# Patient Record
Sex: FEMALE | Race: WHITE
[De-identification: names, ages, dates, MRNs, and addresses within clinical notes are randomized per-mention and may not be internally consistent; named-entity substitution may affect disease eponyms.]

---

## 2017-02-14 ENCOUNTER — HOSPITAL ENCOUNTER (OUTPATIENT)
Dept: HOSPITAL 62 - SP | Age: 82
End: 2017-02-14
Attending: NURSE PRACTITIONER
Payer: MEDICARE

## 2017-02-14 DIAGNOSIS — L97.222: Primary | ICD-10-CM

## 2017-02-14 PROCEDURE — 93925 LOWER EXTREMITY STUDY: CPT

## 2017-02-14 NOTE — XCELERA REPORT
[address] [address]

                             Tel: [phone]

                             Fax: [phone]



                    Lower Extremity Arterial Evaluation

____________________________________________________________________________



Name: CARROLL COOK

MRN: [id number]                Age: 81 yrs

Gender: Female                 : 1935

Patient Status: Outpatient     Patient Location: 

Account #: [id number]

Study Date: 2017 01:22 PM

____________________________________________________________________________



Procedure: A color flow and duplex scan of the lower extremity arteries was

performed bilaterally with velocity and waveform anaylsis. Ankle brachial

indicies performed.

Reason For Study: ULCER





Ordering Physician: MIKEY SAUCEDO

Performed By: Laci Ocasio

____________________________________________________________________________



____________________________________________________________________________





Measurements and Calculations



                                   Right         Left

  CFA PSV                          194.2        189.8    cm/sec

  Prox PFA PSV                     -102.5       -88.0    cm/sec

  Dist SFA PSV                     -115.7       -150.9   cm/sec

  Prox Pop A PSV                   109.4                 cm/sec

  Dist JOSÉ MIGUEL PSV                     120.1        110.0    cm/sec

  Dist PTA PSV                      96.8        -73.9    cm/sec

  Real Pedis PSV                    125.7        104.5    cm/sec



____________________________________________________________________________



Right Side Arterial Evaluation

Normal velocity, waveform biphasic flow from the Common Femoral artery to

the infrageniculate vessels. Triphasic in the Deep Femoral.



The ankle-brachial index was not obtainable due to patient discomfort.



0-19 % stenosis is noted at the inflow, sparing the Deep Femoral.



Left Side Arterial Evaluation

Normal velocity, waveform Triphasic flow from the Common Femoral artery to

the Femoral. Biphasic in the infrageniculate vessels.



The ankle-brachial index was not obtainable due to patient discomfort.



0-19 % stenosis is noted at the infrageniculate level.

____________________________________________________________________________



Interpretation Summary

Mild hemodynamically significant lesions in the bilateral lower

extremities, on duplex imaging, at rest.

____________________________________________________________________________



Electronically signed by:      Lennox Williams      on 2017 04:06 PM



CC: MIKEY SAUCEDO

>

Williams, Lennox

## 2019-02-11 ENCOUNTER — HOSPITAL ENCOUNTER (OUTPATIENT)
Dept: HOSPITAL 62 - OD | Age: 84
End: 2019-02-11
Attending: INTERNAL MEDICINE
Payer: MEDICARE

## 2019-02-11 DIAGNOSIS — R19.7: ICD-10-CM

## 2019-02-11 DIAGNOSIS — E87.6: Primary | ICD-10-CM

## 2019-02-11 LAB
ANION GAP SERPL CALC-SCNC: 8 MMOL/L (ref 5–19)
BUN SERPL-MCNC: 11 MG/DL (ref 7–20)
CALCIUM: 9.2 MG/DL (ref 8.4–10.2)
CHLORIDE SERPL-SCNC: 101 MMOL/L (ref 98–107)
CO2 SERPL-SCNC: 32 MMOL/L (ref 22–30)
GLUCOSE SERPL-MCNC: 121 MG/DL (ref 75–110)
POTASSIUM SERPL-SCNC: 3.7 MMOL/L (ref 3.6–5)
SODIUM SERPL-SCNC: 141.4 MMOL/L (ref 137–145)

## 2019-02-11 PROCEDURE — 36415 COLL VENOUS BLD VENIPUNCTURE: CPT

## 2019-02-11 PROCEDURE — 87045 FECES CULTURE AEROBIC BACT: CPT

## 2019-02-11 PROCEDURE — 87493 C DIFF AMPLIFIED PROBE: CPT

## 2019-02-11 PROCEDURE — 87205 SMEAR GRAM STAIN: CPT

## 2019-02-11 PROCEDURE — 80048 BASIC METABOLIC PNL TOTAL CA: CPT
